# Patient Record
Sex: MALE | Race: WHITE | NOT HISPANIC OR LATINO | Employment: FULL TIME | URBAN - METROPOLITAN AREA
[De-identification: names, ages, dates, MRNs, and addresses within clinical notes are randomized per-mention and may not be internally consistent; named-entity substitution may affect disease eponyms.]

---

## 2019-07-15 ENCOUNTER — HISTORICAL (OUTPATIENT)
Dept: ADMINISTRATIVE | Facility: HOSPITAL | Age: 19
End: 2019-07-15

## 2019-07-15 LAB
ABS NEUT (OLG): 3.1 X10(3)/MCL (ref 2.1–9.2)
ALBUMIN SERPL-MCNC: 4.6 GM/DL (ref 3.4–5)
ALBUMIN/GLOB SERPL: 1.64 {RATIO} (ref 1.5–2.5)
ALP SERPL-CCNC: 44 UNIT/L (ref 38–126)
ALT SERPL-CCNC: 22 UNIT/L (ref 7–52)
AST SERPL-CCNC: 20 UNIT/L (ref 15–37)
BILIRUB SERPL-MCNC: 0.9 MG/DL (ref 0.2–1)
BILIRUBIN DIRECT+TOT PNL SERPL-MCNC: 0.2 MG/DL (ref 0–0.5)
BILIRUBIN DIRECT+TOT PNL SERPL-MCNC: 0.7 MG/DL
BUN SERPL-MCNC: 15 MG/DL (ref 7–18)
CALCIUM SERPL-MCNC: 9.6 MG/DL (ref 8.5–10)
CHLORIDE SERPL-SCNC: 102 MMOL/L (ref 98–107)
CHOLEST SERPL-MCNC: 155 MG/DL (ref 0–200)
CHOLEST/HDLC SERPL: 3 {RATIO}
CO2 SERPL-SCNC: 28 MMOL/L (ref 21–32)
CREAT SERPL-MCNC: 1.04 MG/DL (ref 0.6–1.3)
DEPRECATED CALCIDIOL+CALCIFEROL SERPL-MC: 41.9 NG/ML (ref 30–80)
ERYTHROCYTE [DISTWIDTH] IN BLOOD BY AUTOMATED COUNT: 12.4 % (ref 11.5–17)
GLOBULIN SER-MCNC: 2.8 GM/DL (ref 1.2–3)
GLUCOSE SERPL-MCNC: 104 MG/DL (ref 74–106)
HCT VFR BLD AUTO: 45 % (ref 42–52)
HDLC SERPL-MCNC: 52 MG/DL (ref 35–60)
HGB BLD-MCNC: 16.2 GM/DL (ref 14–18)
LDLC SERPL CALC-MCNC: 81 MG/DL (ref 0–129)
LYMPHOCYTES # BLD AUTO: 2.1 X10(3)/MCL (ref 0.6–3.4)
LYMPHOCYTES NFR BLD AUTO: 38 % (ref 13–40)
MCH RBC QN AUTO: 31.3 PG (ref 27–31.2)
MCHC RBC AUTO-ENTMCNC: 36 GM/DL (ref 32–36)
MCV RBC AUTO: 87 FL (ref 80–94)
MONOCYTES # BLD AUTO: 0.4 X10(3)/MCL (ref 0.1–1.3)
MONOCYTES NFR BLD AUTO: 7.8 % (ref 0.1–24)
NEUTROPHILS NFR BLD AUTO: 54.2 % (ref 47–80)
PLATELET # BLD AUTO: 233 X10(3)/MCL (ref 130–400)
PMV BLD AUTO: 8.8 FL (ref 9.4–12.4)
POTASSIUM SERPL-SCNC: 4.4 MMOL/L (ref 3.5–5.1)
PROT SERPL-MCNC: 7.4 GM/DL (ref 6.4–8.2)
RBC # BLD AUTO: 5.18 X10(6)/MCL (ref 4.7–6.1)
SODIUM SERPL-SCNC: 139 MMOL/L (ref 136–145)
TRIGL SERPL-MCNC: 79 MG/DL (ref 30–150)
TSH SERPL-ACNC: 1.3 MIU/ML (ref 0.35–4.94)
VLDLC SERPL CALC-MCNC: 15.8 MG/DL
WBC # SPEC AUTO: 5.6 X10(3)/MCL (ref 4.5–11.5)

## 2020-07-02 LAB
ABS NEUT (OLG): 3.13 X10(3)/MCL (ref 2.1–9.2)
APTT PPP: 32.9 SECOND(S) (ref 23.2–33.7)
BASOPHILS NFR BLD MANUAL: 1 % (ref 0–2)
ERYTHROCYTE [DISTWIDTH] IN BLOOD BY AUTOMATED COUNT: 12.1 % (ref 11.5–17)
HCT VFR BLD AUTO: 40.3 % (ref 42–52)
HGB BLD-MCNC: 13.3 GM/DL (ref 14–18)
INR PPP: 1.2 (ref 0–1.3)
LYMPHOCYTES NFR BLD MANUAL: 62 % (ref 13–40)
MCH RBC QN AUTO: 29.6 PG (ref 27–31)
MCHC RBC AUTO-ENTMCNC: 33 GM/DL (ref 33–36)
MCV RBC AUTO: 89.6 FL (ref 80–94)
MONOCYTES NFR BLD MANUAL: 6 % (ref 2–11)
NEUTROPHILS NFR BLD MANUAL: 31 % (ref 47–80)
PLATELET # BLD AUTO: 254 X10(3)/MCL (ref 130–400)
PLATELET # BLD EST: NORMAL 10*3/UL
PMV BLD AUTO: 9.8 FL (ref 7.4–10.4)
PROTHROMBIN TIME: 14.6 SECOND(S) (ref 11.1–13.7)
RBC # BLD AUTO: 4.5 X10(6)/MCL (ref 4.7–6.1)
WBC # SPEC AUTO: 10.6 X10(3)/MCL (ref 4.5–11.5)

## 2020-07-08 ENCOUNTER — HISTORICAL (OUTPATIENT)
Dept: SURGERY | Facility: HOSPITAL | Age: 20
End: 2020-07-08

## 2022-04-30 NOTE — OP NOTE
DATE OF SURGERY:        SURGEON:  Hiren Dorsey MD  ASSISTANT:  Dr. Karthik Stuart    PREOPERATIVE DIAGNOSIS:  Nasal obstruction.    POSTOPERATIVE DIAGNOSIS:  Nasal obstruction.    PROCEDURE:  Open septoplasty, nasal valve reconstruction using septal cartilage grafts to the nasal valve region and columellar strut.    PROCEDURE IN DETAIL:  Patient brought to the operating room and placed in supine position.  After achieving general endotracheal anesthesia, eyes were protected with Lacri-Lube with tape, while a throat pack was placed to the oropharynx.  Then diluted Betadine solution/paint was placed in both nares where it was kept in place for approximately a minute before suctioning out.  The nose was then decongested with topical cocaine and adrenaline.  After a sufficient time had elapsed for decongesting, 1% lidocaine with 1:100,000 epinephrine were injected into the soft tissues of the nose.  The patient was prepped and draped for surgery.  After allowing for vasoconstriction, an 11 blade was used to make an inverted V mid columellar incision.  With a 15 blade, marginal incisions were made bilaterally and the nasal skin, soft tissue envelope was elevated in an avascular plane.  The periosteum of the nasal bones was scored, then elevated.  Then the alar cartilages  in the midline with Iris scissors and junction tunnels made.  Bilateral mucoperichondrial flaps were elevated.  Noted to have a cartilaginous deviation to the left with a bony deviation along the floor of the nose to the right.  After elevating both flaps, the deviated portions of the quadrilateral cartilage were removed with care being taken to leave appropriate dorsal and caudal struts.  The spur on the floor of the nose was taken down with Fleming Rogers forceps superiorly and a floor chisel inferiorly.  The spur was removed.  Once the airway was improved, 4-0 plain sutures were then used in mattress fashion to re-coapt the  mucoperichondrial flaps.   grafts harvested from the septal cartilage were placed between the upper lateral cartilages and septum and sewn in place with multiple 4-0 PDS sutures in mattress fashion.  Then for further tip support, a columellar strut harvested also from septal cartilage placed in the medial crura and sewn in place with 5-0 PDS as well as 4-0 plain suture.  Once this was completed, the columellar incision was then closed with 5-0 fast absorbing gut sutures.  The marginal incisions were closed with 4-0 chromic sutures.  A small amount of Bactroban-soaked Nu Gauze was used to pack the nose anteriorly.  A standard Gelfoam, Steri-Strip, and Aquaplast dressing was placed.  Throat pack was removed.  Pharynx carefully suctioned,.  Tape removed from the eyes.  They were washed with     BSS and cold compresses applied.  The patient tolerated the procedure well, was awakened and extubated in the operating room and brought to recovery in stable condition.        ______________________________  MD KARYN Payne/UY  DD:  07/08/2020  Time:  09:15AM  DT:  07/08/2020  Time:  09:38AM  Job #:  [id number]

## 2022-06-22 PROBLEM — G43.909 MIGRAINE HEADACHE: Status: ACTIVE | Noted: 2022-06-22

## 2022-06-22 PROBLEM — J34.2 DEVIATED NASAL SEPTUM: Status: ACTIVE | Noted: 2022-06-22
